# Patient Record
(demographics unavailable — no encounter records)

---

## 2025-01-10 NOTE — PHYSICAL EXAM
[Chaperone Present] : A chaperone was present in the examining room during all aspects of the physical examination [45620] : A chaperone was present during the pelvic exam. [Appropriately responsive] : appropriately responsive [Alert] : alert [No Acute Distress] : no acute distress [No Lymphadenopathy] : no lymphadenopathy [Soft] : soft [Non-tender] : non-tender [Non-distended] : non-distended [No HSM] : No HSM [No Lesions] : no lesions [No Mass] : no mass [Oriented x3] : oriented x3 [Examination Of The Breasts] : a normal appearance [No Masses] : no breast masses were palpable [Labia Majora] : normal [Labia Minora] : normal [Uterine Adnexae] : normal [Normal rectal exam] : was normal [Normal Brown Stool] : was normal and brown [Normal] : was normal [None] : there was no rectal mass  [FreeTextEntry2] : This note was written by Susy Aldridge actively solely DR. KUSUM M.D.   All medical record entries made by the Scribe were at my, DR. KUSUM M.D., direction and personally dictated by me during this visit. I have personally reviewed the chart and agree that the record reflects my personal performance of the history, physical exam, assessment, and plan.  [Enlarged ___ wks] : enlarged [unfilled] ~Uweeks [Occult Blood Positive] : was negative for occult blood analysis [Internal Hemorrhoid] : no internal hemorrhoids were present [External Hemorrhoid] : no external hemorrhoids were present [Skin Tags] : no residual hemorrhoidal skin tags [FreeTextEntry6] : Small fibroid uterus

## 2025-01-10 NOTE — PLAN
[FreeTextEntry1] : I have spent 40 minutes of time on this encounter. Greater than 50% of the face-to-face encounter time was spent on counseling and/or coordination of care for examination findings, differential, testing, management, and planning. 10 minutes were allotted to discussing the depression screening.   Yearly breast cancer screening with no current clinical or radiographic concerns. The patient was reminded regarding future well breast and general healthcare, breast cancer risk reduction, the importance of self-examination, and the need for follow-up. She was again reminded of the need to take Vit D3 2500 IU daily or to keep a Vit D level above 30, and Tumeric 1000 mg daily with Black Pepper. Plan continued yearly imaging and breast follow-up, sooner as needed. I counseled the patient on current recommendations to reduce breast cancer risk including but not inclusive to regular exercise 20-30 minutes 3-4 times a week, low-fat diet, limiting alcohol consumption, maintenance of ideal body weight, yearly imaging, and self-breast awareness. Questions answered. I encouraged in light of Covid 19, social distancing, frequent hand washing, and precautions to stay healthy.  1) Self-breast exam instructions, and calcium supplementation discussed with the patient. 2) Mammography, lipid profile assessment, TSH screening, fasting glucose testing, and colonoscopy screening were discussed with the patient. Vitamin D supplementation. 3) Maintain healthy weight. 4) Regular health maintenance with PCP. 5) Remain tobacco free. 6) Limit alcohol intake to less than 5 drinks per week.  7) Osteoporosis screening. 8) Annual cholesterol screening.  9) Annual influenza vaccine. The importance of routine physical activity was reviewed and a goal of 150 minutes of moderately vigorous exercise per week was endorsed.   Recommended pt to obtain a pelvic sonogram. All questions were answered.    What are fibroids? Uterine fibroids are the most common benign noncancerous tumors or growths in women of childbearing age.  They grow in and around the muscular wall of the uterus and can grow as a single tumor or there can be many.  They can be as small as an apple seed or as big as a grapefruit.  Fibroids can grow or stay about the same size over time.  Another term for fibroids is leiomyoma or just myoma.   What are the symptoms of fibroids? Fibroids do not always cause symptoms. People who have symptoms may find fibroids hard to live with. Symptoms of fibroids can include: Changes in menstruation             Heavy bleeding during a period that soaks through 1 or more tampons or pads every hour or less more than 7 days.             Vaginal bleeding between periods             Heavy and irregular bleeding can cause anemia             Painful periods Fullness or pressure             Feeling "full" in the lower abdomen.             And urged to urinate more often or difficulty urinating Pain             Pain during sex             Pain in the belly or lower back Reproductive problems             Infertility             Miscarriage             Early labor What causes fibroids? No one knows for sure what causes fibroids.  Researchers think more than 1 factor could play a role. These factors could include: Age.  Risks of fibroids increases with age and peaks around age 50.  In some women, fibroids may shrink after menopause.               Family history or genetics.  Having a family member such as a mother or sister or aunt with fibroids increases your risk.               Race/ethnic origin.  Fibroids a 2-3 times more common in -American women then ,  or White people.  -American people also tend to get fibroids at a younger age, and their fibroids tend to grow faster.   Weight.  Being overweight or obese increases the risk of fibroids. Hormones.  Fibroids can be affected by the level of hormones/estrogen and progesterone.               High blood pressure.  Hypertension increases the risk of fibroids.               Dietary factor.  (E.G.  Vitamin D deficiency, use of soybean milk).       How do I know if I have fibroids?   Less you have symptoms, you might not know if you have fibroids.  In some cases, a healthcare provider will find fibroids during a routine pelvic exam.  Your provider may also order imaging test to see a picture of the fibroids.  Surgery (I.  E.  Hysteroscopy) can confirm the ultrasound diagnosis of fibroids and provide additional information like the location of the fibroids.       How are fibroids treated?   If you have symptoms, there are medical treatments that can help.  Talk with your healthcare provider about the best way to treat your fibroids.  They will consider many's things, such as:               What symptoms you and theirseverity               If you want to get pregnant in the future               The size of the fibroids               The location of the fibroids               Your age and how close you are to the onset of menopause   If you have fibroids but do not have symptoms, you may not need medical intervention.  Your provider can check during regular exams to see if your fibroids have grown and asked you whether you have noticed any new or worsening symptoms.  Your provider may recommend an imaging test to check growth or change in size.   Talk to you provided to discuss if you should take medicine to manage her symptoms such as:               Iron supplements to treat or prevent anemia               Stool softener to help with constipation               Over-the-counter or prescription pain medication to help ease the pain due to fibroids   Your provider may recommend a medical treatment with hormones to help control bleeding.  This may include:               Birth control products such as pills, patches or vaginal contraceptive rings               Progesterone pills or injections               IUD with hormone               Newer medications approved to manage heavy menstrual bleeding associated with fibroids in premenopausal women   Talk with your provider about the risks, benefits, and side effects before you start or stop medical treatment.   If you have moderate or severe symptoms from your fibroids, your provider may recommend medical treatments as discussed above or surgical procedure.  Surgical procedures include:               Myomectomysurgery to remove fibroids without removing healthy tissue of the uterus.               Hysterectomysurgery to remove the entire uterus.               Fibroid ablationa surgical procedure that uses energy to destroy fibroid tissue while avoiding damage to the normal uterine tissue.  The fibroids are not removed but shrink in size.               Uterine fibroid embolization or uterine artery embolizationa thin tube was threaded into the blood vessels that supply blood to the fibroid.  Then tiny plastic or gel particles are injected into the blood vessels.  This blocks the blood supply to the fibroid, causing it to shrink.   Talk with your provider about all your medical treatment options and their risks and benefits.  Your provider can answer all your questions and together you can decide which treatment is right for you.

## 2025-01-10 NOTE — PROCEDURE
[Cervical Pap Smear] : cervical Pap smear [Liquid Base] : liquid base [Tolerated Well] : the patient tolerated the procedure well [No Complications] : there were no complications [Pelvic Mass] : pelvic mass [Fibroid Uterus] : fibroid uterus [Transvaginal Ultrasound] : transvaginal ultrasound

## 2025-01-10 NOTE — HISTORY OF PRESENT ILLNESS
[Y] : Patient is sexually active [LMP unknown] : LMP unknown [Hot Flashes] : hot flashes [Night Sweats] : night sweats [Experiencing Menopausal Sxs] : experiencing menopausal symptoms [No] : Patient does not have concerns regarding sex [Currently Active] : currently active [FreeTextEntry1] : Pt presents today for an annual exam. Pt is doing well. She offers no complaints. We reviewed together in detail her past medical and surgical histories, allergies and medication usage, and social and family history. All questions were answered in easy-to-understand language.  [Mammogramdate] : 2/3/2024 [BreastSonogramDate] : 2/3/2024 [PapSmeardate] : 12/27/2023 [BoneDensityDate] : 5/22/2023 [ColonoscopyDate] : 01/2022 [LMPDate] : 2018

## 2025-02-07 NOTE — HISTORY OF PRESENT ILLNESS
[de-identified] : 58 yo female following up for her low back pain, 3.5 years from her L5-S1 microdiscectomy on 7/22/2021. She states that she has had low back pain and right calf numbness for a few weeks. She has been taking Tramadol for pain relief. Patient denies bowel/bladder incontinence. Denies saddle anesthesia.   09.09.24 58 yo female following up for her low back pain, 3 years from her L5-S1 microdiscectomy on 7/22/2021.  Overall she is stable.  She is still dealing with back pain and leg pain secondary to her spinal pathology.  No bowel bladder issues.  No saddle anesthesia.  4/26/2024 Patient is a 59-year-old female who presents for f/u eval of her lower bp. Patient reports persistent back pain and exacerbation of sxs with prolonged periods of sitting and standing. Following up with Neurologist for constant paresthesia in LT arm.   02.02.24 Patient is a 58 year old female who presents for f/u eval of her lower back pain and radicular sxs down her RT lower extremity. She received an injection to RT shoulder which provided relief of sxs. Details leaning forward for relief of back pain. Pt is s/p 2 years 6 months L5-S1 microdiscectomy. Today, she states experiencing lower back pain and b/l LE pain, significant on RT side.   09.06.23 58 year old female who presents for follow-up evaluation of her lower back pain and radicular sxs down her RT lower extremity over an S1 distribution. Patient reports she experiences bouts of pain where her entire back would lock up and make it difficult to move or walk. She reports experiencing this on sunday where she proceeded to the ER and had a CT scan done.   07/07/2023 58 year old female who presents for follow-up evaluation of her lower back pain and radicular sxs down her RT lower extremity over an S1 distribution. Today, the patient reports she feels the same relative to her previous visit. She reports seeing Dr. Ragland who instructed her against receiving another injection as she received 2 this year so far. Reports she will be undergoing a nerve study w/ Dr. Ragland and will pursue further care.  Denies any bowel/bladder incontinence. Denies any saddle anesthesia.   06/14/2023 58 year old female who presents for follow-up evaluation of her lower back pain and radicular sxs down her RT lower extremity over an S1 distribution. She reports sitting and standing for long periods of time exacerbates her sxs. She reports her RT lower extremity is completely numb and makes it very difficult for her to walk or get from a seated position.   04/06/2023 58 year old female who presents for follow-up evaluation of her lower back pain and radicular sxs down her RT lower extremity over an S1 distribution. Today, the patient reports she decided not to receive the SI joint injection. She reports moving from a seated position or laying down exacerbates her sxs. She reports taking Tramadol for pain sxs with minor relief. Patient reports she walks as much as possible but is in pain afterwards. Patient reports numbness and pain in her posterior lateral calf and toes of her RT lower extremity  Denies any saddle anesthesia. Denies any bowel/bladder incontinence   02/02/2023 Today the patient states that she is still dealing with pain and numbness over her right leg over an S1 distribution. She also has left sided back pain. She has continued losing weight with home exercises.  She denies any bowel bladder issues.  She denies any saddle anesthesia.  Unfortunate this pain in her right leg is still disabling at times. She states she has been unable to sleep due to the pain.She notes numbness in the foot that has caused falls in the past. She reports taking the Gabapentin 300 TID, with mild relief to symptoms. She reports taking Tylenol prn.   10/07/22 Today the patient states that she is still dealing with pain and numbness over her right leg over an S1 distribution.  She denies any bowel bladder issues.  She denies any saddle anesthesia.  She states since July she has had improvement in her overall symptoms.  Unfortunate this pain in her right leg is still disabling at times.  08/09/22 This is a 57-year-old female that is now approximately 9 months out from surgery.  Over the past 2 weeks she has had Worsening pain over her left leg.  She describes pain that does radiate down her posterior lateral thigh posterior lateral calf.  It has interfered with her ability to walk.  She denies any focal areas of weakness .  She denies any bowel bladder issues.  She denies any saddle anesthesia.  12/10/21 This is a 56-year-old female that is now approximately 4 months out from L5-S1 microdiscectomy.  In terms of her radicular symptoms she does not have the acute radiculopathy that she had down her right leg surgery.  She is still dealing with numbness over her right and at times her left leg over an S1 distribution.  This describes numbness over her posterior thigh and over her foot.  She has had episodes of significant back pain.  This comes on and off and she is concerned that physical therapy may be exacerbating her symptoms.  Of note the patient has been recently evaluated by bariatric surgery and is considering surgical treatment of her obesity.  10/25/21 56-year-old female approximately 8 weeks from L5-S1 microdiscectomy for radiculopathy. Patient reports improvement in pain since surgery and rates the pain 5 out of 10. Patient reports she is still experiencing numbness and cramping in the Right leg and right foot and Lower Back Pain. Patient denies any bowel or bladder issues. Patient is able to walk 10 blocks or more with minimal or no pain during activity. Currently being evaluated for bariatric surgery.

## 2025-02-07 NOTE — PHYSICAL EXAM
[de-identified] :  Lumbar Physical Exam   Gait - Normal   Station - Normal   Sagittal balance - Normal   Compensatory mechanism? - None   Reflexes Patellar - normal Gastroc - normal Clonus - No   Hip Exam - Normal   Straight leg raise - none   Pulses - 2+ dp/pt   Range of motion - normal   Sensation Sensation intact to light touch in L1, L2, L3, L4, L5 and S1 dermatomes bilaterally   Motor IP Quad HS TA Gastroc EHL Right 5/5 5/5 5/5 5/5 5/5 5/5 Left 5/5 5/5 5/5 5/5 5/5 5/5 [de-identified] : Lumbar radiographs demonstrated L3 pedicle screw Fx no motion on flexion/extension L4-L5 SVA positive >5cm  Previous imaging: Lumbar CT scan reviewed from hospital RT L3 andL4 pedicle screw fractured  Lumbar radiographs L3-L5 fusion noted  Lumbar MRI reviewed New MRI obtained over the weekend. I do not note the L5-S1 disc herniation which was present prior to surgery There is a swollen S1 nerve root noted, on the right side  New lumbar MRI reviewed with the patient In my opinion she has a component of recurrent disc herniation L5-S1 There is granulation tissue around the S1 nerve root descending, some evidence of a swollen S1 nerve root  Lumbar MRI reviewed 04/06/2023  L5-S1 disc herniation some evidence of a swollen S1 nerve root  calcification noted at the L4-L5 level

## 2025-02-07 NOTE — ASSESSMENT
[FreeTextEntry1] : I had a lengthy discussion with the patient in regards to their treatment plan and diagnosis. Their symptoms have persisted despite the conservative management they have attempted thus far.  As a result I would like to proceed with a lumbar MRI.  In tandem with this they should begin a physical therapy/home therapy program.  The patient can take Tylenol/NSAIDs as needed for pain control if medically able to.  I will have the patient follow-up in 2 to 3 weeks for repeat clinical evaluation.  I encouraged them to follow-up sooner if their symptoms worsen or change in any way.

## 2025-02-28 NOTE — ASSESSMENT
[FreeTextEntry1] : I had a lengthy discussion with the patient in regard to their treatment plan and diagnosis. Their symptoms have persisted despite the conservative management they have attempted thus far. I discussed that there is a possibility of a progression of spondylolisthesis if not addressed. At this time, the patient would like to proceed with conservative care. As a result, I would like to proceed with a referral to a pain management specialist to see if they are eligible for an epidural injection and other non-operative treatment options. In tandem with this, they should continue with physical therapy/home exercise program. The patient can take Tylenol/NSAIDs as needed for pain control if medically able to. I will have the patient follow up as needed. I encouraged the patient to follow-up sooner if there are any new or worsening symptoms.

## 2025-02-28 NOTE — PHYSICAL EXAM
[de-identified] :  Lumbar Physical Exam   Gait - Normal   Station - Normal   Sagittal balance - Normal   Compensatory mechanism? - None   Reflexes Patellar - normal Gastroc - normal Clonus - No   Hip Exam - Normal   Straight leg raise - none   Pulses - 2+ dp/pt   Range of motion - normal   Sensation Sensation intact to light touch in L1, L2, L3, L4, L5 and S1 dermatomes bilaterally   Motor IP Quad HS TA Gastroc EHL Right 5/5 5/5 5/5 5/5 5/5 5/5 Left 5/5 5/5 5/5 5/5 5/5 5/5 [de-identified] : lumbar MRI upon my evaluation, overall alignment of lumbosacral appears largely the same  [facility] radiologist states progression of slip at L5-S1 patient has transitional lumbosacral anatomy  Previous imaging: Lumbar radiographs demonstrated L3 pedicle screw Fx no motion on flexion/extension L4-L5 SVA positive >5cm  Previous imaging: Lumbar CT scan reviewed from hospital RT L3 andL4 pedicle screw fractured  Lumbar radiographs L3-L5 fusion noted  Lumbar MRI reviewed New MRI obtained over the weekend. I do not note the L5-S1 disc herniation which was present prior to surgery There is a swollen S1 nerve root noted, on the right side  New lumbar MRI reviewed with the patient In my opinion she has a component of recurrent disc herniation L5-S1 There is granulation tissue around the S1 nerve root descending, some evidence of a swollen S1 nerve root  Lumbar MRI reviewed 04/06/2023  L5-S1 disc herniation some evidence of a swollen S1 nerve root  calcification noted at the L4-L5 level

## 2025-02-28 NOTE — HISTORY OF PRESENT ILLNESS
[de-identified] : 60 yo female following up for her low back pain, 3.5 years from her L5-S1 microdiscectomy on 7/22/2021. She states that she has left low back pain.   02.07.25 60 yo female following up for her low back pain, 3.5 years from her L5-S1 microdiscectomy on 7/22/2021. She states that she has had low back pain and right calf numbness for a few weeks. She has been taking Tramadol for pain relief. Patient denies bowel/bladder incontinence. Denies saddle anesthesia.   09.09.24 60 yo female following up for her low back pain, 3 years from her L5-S1 microdiscectomy on 7/22/2021.  Overall she is stable.  She is still dealing with back pain and leg pain secondary to her spinal pathology.  No bowel bladder issues.  No saddle anesthesia.  4/26/2024 Patient is a 59-year-old female who presents for f/u eval of her lower bp. Patient reports persistent back pain and exacerbation of sxs with prolonged periods of sitting and standing. Following up with Neurologist for constant paresthesia in LT arm.   02.02.24 Patient is a 58 year old female who presents for f/u eval of her lower back pain and radicular sxs down her RT lower extremity. She received an injection to RT shoulder which provided relief of sxs. Details leaning forward for relief of back pain. Pt is s/p 2 years 6 months L5-S1 microdiscectomy. Today, she states experiencing lower back pain and b/l LE pain, significant on RT side.   09.06.23 58 year old female who presents for follow-up evaluation of her lower back pain and radicular sxs down her RT lower extremity over an S1 distribution. Patient reports she experiences bouts of pain where her entire back would lock up and make it difficult to move or walk. She reports experiencing this on sunday where she proceeded to the ER and had a CT scan done.   07/07/2023 58 year old female who presents for follow-up evaluation of her lower back pain and radicular sxs down her RT lower extremity over an S1 distribution. Today, the patient reports she feels the same relative to her previous visit. She reports seeing Dr. Ragland who instructed her against receiving another injection as she received 2 this year so far. Reports she will be undergoing a nerve study w/ Dr. Ragland and will pursue further care.  Denies any bowel/bladder incontinence. Denies any saddle anesthesia.   06/14/2023 58 year old female who presents for follow-up evaluation of her lower back pain and radicular sxs down her RT lower extremity over an S1 distribution. She reports sitting and standing for long periods of time exacerbates her sxs. She reports her RT lower extremity is completely numb and makes it very difficult for her to walk or get from a seated position.   04/06/2023 58 year old female who presents for follow-up evaluation of her lower back pain and radicular sxs down her RT lower extremity over an S1 distribution. Today, the patient reports she decided not to receive the SI joint injection. She reports moving from a seated position or laying down exacerbates her sxs. She reports taking Tramadol for pain sxs with minor relief. Patient reports she walks as much as possible but is in pain afterwards. Patient reports numbness and pain in her posterior lateral calf and toes of her RT lower extremity  Denies any saddle anesthesia. Denies any bowel/bladder incontinence   02/02/2023 Today the patient states that she is still dealing with pain and numbness over her right leg over an S1 distribution. She also has left sided back pain. She has continued losing weight with home exercises.  She denies any bowel bladder issues.  She denies any saddle anesthesia.  Unfortunate this pain in her right leg is still disabling at times. She states she has been unable to sleep due to the pain.She notes numbness in the foot that has caused falls in the past. She reports taking the Gabapentin 300 TID, with mild relief to symptoms. She reports taking Tylenol prn.   10/07/22 Today the patient states that she is still dealing with pain and numbness over her right leg over an S1 distribution.  She denies any bowel bladder issues.  She denies any saddle anesthesia.  She states since July she has had improvement in her overall symptoms.  Unfortunate this pain in her right leg is still disabling at times.  08/09/22 This is a 57-year-old female that is now approximately 9 months out from surgery.  Over the past 2 weeks she has had Worsening pain over her left leg.  She describes pain that does radiate down her posterior lateral thigh posterior lateral calf.  It has interfered with her ability to walk.  She denies any focal areas of weakness .  She denies any bowel bladder issues.  She denies any saddle anesthesia.  12/10/21 This is a 56-year-old female that is now approximately 4 months out from L5-S1 microdiscectomy.  In terms of her radicular symptoms she does not have the acute radiculopathy that she had down her right leg surgery.  She is still dealing with numbness over her right and at times her left leg over an S1 distribution.  This describes numbness over her posterior thigh and over her foot.  She has had episodes of significant back pain.  This comes on and off and she is concerned that physical therapy may be exacerbating her symptoms.  Of note the patient has been recently evaluated by bariatric surgery and is considering surgical treatment of her obesity.  10/25/21 56-year-old female approximately 8 weeks from L5-S1 microdiscectomy for radiculopathy. Patient reports improvement in pain since surgery and rates the pain 5 out of 10. Patient reports she is still experiencing numbness and cramping in the Right leg and right foot and Lower Back Pain. Patient denies any bowel or bladder issues. Patient is able to walk 10 blocks or more with minimal or no pain during activity. Currently being evaluated for bariatric surgery.

## 2025-02-28 NOTE — ADDENDUM
[FreeTextEntry1] :  I, Sierra Sultana, acted solely as a scribe for Dr. Chandrakant Alcantara MD on this date 02/28/2025    All medical record entries made by the Scribe were at my, Dr. Chandrakant Alcantara MD., direction and personally dictated by me on 02/28/2025 . I have reviewed the chart and agree that the record accurately reflects my personal performance of the history, physical exam, assessment and plan. I have also personally directed, reviewed, and agreed with the chart.

## 2025-07-10 NOTE — CONSULT LETTER
[Dear  ___] : Dear  [unfilled], [Consult Letter:] : I had the pleasure of evaluating your patient, [unfilled]. [Please see my note below.] : Please see my note below. [Consult Closing:] : Thank you very much for allowing me to participate in the care of this patient.  If you have any questions, please do not hesitate to contact me. [Sincerely,] : Sincerely, [FreeTextEntry2] : Kelly Orr [FreeTextEntry3] :  Norbert Tate MD FACP FCCP Pulmonary Diseases and Critical Care Medicine. Chief, Pulmonary Diseases, [facility]

## 2025-07-10 NOTE — CONSULT LETTER
[Dear  ___] : Dear  [unfilled], [Consult Letter:] : I had the pleasure of evaluating your patient, [unfilled]. [Please see my note below.] : Please see my note below. [Consult Closing:] : Thank you very much for allowing me to participate in the care of this patient.  If you have any questions, please do not hesitate to contact me. [Sincerely,] : Sincerely, [FreeTextEntry2] : Kelly Orr [FreeTextEntry3] :  Norbert Tate MD FACP FCCP Pulmonary Diseases and Critical Care Medicine. Chief, Pulmonary Diseases, [facility]

## 2025-07-10 NOTE — HISTORY OF PRESENT ILLNESS
[TextBox_4] : 60-year-old female for sleep apnea evaluation.  She has snoring and daytime somnolence.  She awakens with headaches.  She has nasal congestion.  She has had some palpitations at rest and saw a cardiologist and placed on metoprolol.  She has gained some weight since she saw us last time.  She denies shortness of breath.

## 2025-07-10 NOTE — DISCUSSION/SUMMARY
[FreeTextEntry1] : Osiris has likely sleep apnea since she has gained some weight.  Will order home sleep study.  She does have nasal symptoms and therefore will start Flonase 2 sniffs twice daily.  Advised to lose weight.  She may try magnesium supplement for her palpitations.  Follow-up with cardiology.  PFT was normal.  I will see her again in 3 months.    Time spent reviewing file, taking history , and examining patient: _43_________ minutes

## 2025-07-10 NOTE — PHYSICAL EXAM
[No Acute Distress] : no acute distress [Normal Oropharynx] : normal oropharynx [Normal Appearance] : normal appearance [Supple] : supple [Thyroid Not Enlarged] : thyroid not enlarged [No Neck Mass] : no neck mass [No Thyroid Nodules] : no thyroid nodules [Normal Rate/Rhythm] : normal rate/rhythm [Normal S1, S2] : normal s1, s2 [No Murmurs] : no murmurs [No Rubs] : no rubs [No Gallops] : no gallops [No Resp Distress] : no resp distress [No Acc Muscle Use] : no acc muscle use [Normal Palpation] : normal palpation [Clear to Auscultation Bilaterally] : clear to auscultation bilaterally [Normal to Percussion] : normal to percussion [No Abnormalities] : no abnormalities [Benign] : benign [Not Tender] : not tender [No Masses] : no masses [Soft] : soft [No Clubbing] : no clubbing [No Cyanosis] : no cyanosis [No Edema] : no edema [Normal Color/ Pigmentation] : normal color/ pigmentation [Normal Affect] : normal affect [TextBox_2] : overweight

## 2025-07-14 NOTE — ASSESSMENT
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

## 2025-07-14 NOTE — HISTORY OF PRESENT ILLNESS
[FreeTextEntry1] : Anti-obesity medications: Obesity medication side effects: Bariatric surgery history: sleeve 1/2022, preop 238  Obesity co-morbidities: gerd, htn  Co-morbidities improved or resolved: highest adult weight: 241  goal weight: 140 other pmhx: gabapentin - nerve damage, tramadol (back pain, lumbar spinal fusion 2018), gerd, htn, arthritis, anxiety, palpitations on metoprolol and htn, constipation  surgical hx: panniculectomy   61 yo female with class 3 obesity, h/o sleeve, presents for follow up. Comorbid conditions include htn, back pain, gerd.  She was in topiramate in the past to some success.  She has been off of this since last year and notes about 8 lbs weight gain.  She is still on mtv, tries to focus on getting in protein in her meals.    Patient lives -   and 2 adult kids. supportive Employment status -  out on disability with her back, possibly permanent

## 2025-07-14 NOTE — REASON FOR VISIT
[Follow-Up] : a follow-up visit [Home] : at home, [unfilled] , at the time of the visit. [Medical Office: ([facility])___] : at the medical office located in  [Patient] : the patient [Self] : self